# Patient Record
(demographics unavailable — no encounter records)

---

## 2024-10-29 NOTE — DISCUSSION/SUMMARY
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  She continues to have low back pain.  She reports pain with activity.  She has pain coming to the right hip.  She is fine during the day but at night she reports severe pain.  She has been taking muscle relaxers intermittently.  She does not like take medications due to the side effects.  She has been doing physical therapy.  Has no issues with gait or balance or bowel bladder incontinence.  No issues with fine motor activity.  Examination Patient is NAD, AAOX3 skin is intact, NTTP in LS SPINE mild pain with ROM 5/5 motor strength in BLE SILT L1-S1 BLE Negative straight leg raise Negative christian equal patella/achilles reflex normal gait  Imaging  MRI of the lumbar spine demonstrates L4-5 spondylolisthesis with right-sided facet arthropathy and a facet cyst outside the space.  There is mild lateral recess stenosis at L4-L5.  There is some hemangiomas without cortical expansion  Treatment note I discussed at length with the patient nature of the condition.  Her MRI demonstrates degenerative changes at L4-L5 to the right in the setting of spondylolisthesis.  She is a good candidate for injections and MBB RFA's.  Will refer her to Dr. Gillis.  She will continue with physical therapy.  Will provide with tizanidine, we discussed the side effects of this including sedation.  I will see her back on as-needed basis.  All the patient's questions were sought and answered.

## 2024-12-02 NOTE — DATA REVIEWED
[FreeTextEntry1] : Exam: MRI LUMBAR SPINE Order#: [id number]    CLINICAL INFORMATION: Low back pain.  ADDITIONAL CLINICAL INFORMATION: Spondylolisthesis M43.16  TECHNIQUE: Multiplanar, multisequence MRI was performed of the lumbar spine. IV Contrast: NONE  PRIOR STUDIES: No priors available for comparison.  FINDINGS:  LOCALIZER: No additional findings. BONES: There is no fracture or bone marrow edema. There is a hemangioma seen within the L1 vertebral body and a hemangioma seen in the L4 vertebral body. ALIGNMENT: There is minimal dextroscoliosis of the lumbar spine. There is minimal subluxation of L4 on L5 to the right (approximately 3 mm). There is grade 1 anterolisthesis of L4 on L5 (approximately 3 mm). No pars defects are seen. DISC SPACES: There is mild loss of height and T2 signal of the L2/L3 and L4/L5 disc spaces consistent with desiccation. There is loss of T2 signal within the remaining disc spaces consistent with desiccation. SACROILIAC JOINTS/SACRUM: There is no sacral fracture. The SI joints are partially visualized but are intact. CONUS AND CAUDA EQUINA: The distal cord and conus are normal in signal. Conus terminates at T12/L1.  VISUALIZED INTRAPELVIC/INTRA-ABDOMINAL SOFT TISSUES: Normal. PARASPINAL SOFT TISSUES: Normal.   INDIVIDUAL LEVELS:  LOWER THORACIC SPINE: No spinal canal or neuroforaminal stenosis.  At the L5/S1 level there is a very minimal disc bulge with a right posterior para central annular tear contacting the ventral thecal sac. The bilateral neuroforamen are patent. The bilateral L5 foraminal exiting nerve roots are within normal limits. There is no evidence of spinal canal stenosis.  At the L4/L5 level there is unroofing of the posterior disc space due to the mild anterolisthesis. There is a superimposed diffuse disc bulge and osteophyte flattening the ventral thecal sac. There is mild right and mild to moderate left foraminal narrowing. There is severe facet and ligamentous hypertrophy. There is a tiny amount of fluid seen in the facet joints. The bilateral L4 foraminal exiting nerve roots are within normal limits There is a small 0.5 cm synovial cyst along the posterior right facet and that it in the posterior paraspinal muscles. There is no evidence of spinal canal stenosis.  At the L3/L4, L2/L3 and L1/L2 levels there are right subarticular and foraminal minimal disc protrusions mildly flattening the right ventral thecal sac and contributing to minimal right foraminal narrowing. The bilateral foraminal exiting nerve roots appear within normal limits There is minimal left foraminal narrowing. There is no evidence of spinal canal stenosis.    IMPRESSION:  Minimal dextroscoliosis of the lumbar spine.  Minimal minimal subluxation of L4 on L5 to the right.  Grade 1 anterolisthesis of L4 on L5. No pars defects are seen.  L5/S1 very minimal disc bulge with a right posterior para central annular tear. L5/S1 no evidence of spinal canal stenosis.  L4/L5 diffuse disc bulge and osteophyte. L4/L5 no evidence of spinal canal stenosis.  L3/L4, L2/L3 and L1/L2 minimal right subarticular and foraminal minimal disc protrusions. No evidence of spinal canal stenosis.

## 2024-12-02 NOTE — HISTORY OF PRESENT ILLNESS
[Back Pain] : back pain [___ mths] : [unfilled] month(s) ago [Constant] : constant [6] : a minimum pain level of 6/10 [10] : a maximum pain level of 10/10 [Sharp] : sharp [Burning] : burning [Sitting] : sitting [Standing] : standing [Heat] : heat [Rest] : rest [Other: ___] : [unfilled] [FreeTextEntry1] : HPI: Ms. JACOB PRITCHETT is a 67 year F with no pmhx of osteopenia, recent left foot tendonitis (boot x 2 months), chronic lower back pain. Reports worsening of her lower back pain over the past 4 months. Pain severe with prolonged standing and sitting. Has been in physical therapy twice weekly since October but does not feel like it has been effective. Prn ibuprofen and lidocaine patches when pain is severe. Pain can be severe and is impacting her quality of life. Denies any additional weakness, numbness, bowel/bladder dysfunction, history of bleeding disorders.  [FreeTextEntry7] : lower back pain

## 2024-12-02 NOTE — ASSESSMENT
[FreeTextEntry1] : 67 yof presents w/ back pain.  I have personally reviewed the patient's MRI in detail and discussed my personal interpretation of the study which is significant for facet arthropathy in the lumbar spine.  The patient has moderate to severe chronic axial back pain which has been present for at least three months and has failed to improve with conservative therapy. There is no untreated radiculopathy. There is no other known cause of axial back pain in this patient. The patient has failed to have relief with medication management and physical therapy. Given the patients failure to improve with all other conservative measures, recommend left L3, L4, and L5 medial branch diagnostic block under fluoroscopic guidance. If the patient has significant relief of pain and improved quality of life following diagnostic block, will proceed to radiofrequency ablation.  I have discussed in detail with the patient that an interventional spine procedure is associated with potential risks. The procedure may include an injection of steroid and potentially other medications (local anesthetic and normal saline) into the epidural space or surrounding tissue of the spine. There are significant risks of this procedure which include and are not limited to infection, bleeding, worsening pain, dural puncture leading to post-dural puncture headache, nerve damage, spinal cord injury, paralysis, stroke, and death. There is a chance that the procedure does not improve their pain. I have discussed the risks of the procedure at length with the patient, and the potential benefits of pain relief. I have offered alternatives to the procedure. All questions were answered. The patient expressed understanding and wishes to proceed with the procedure.  Physical therapy prescribed - goal will be to increase ROM, strengthening, postural training, other modalities ad ginny which may include massage and stim. Goals of therapy discussed with the patient in detail and will be discussed with physical therapist. Patient will follow-up following course of physical therapy to monitor progress and adjust therapy as needed.  Acetaminophen 1,000 mg q8h prn for moderate pain. Risks, benefits, and alternatives of acetaminophen discussed with patient.  Ibuprofen 600 mg q8h prn add when pain is not adequately controlled with acetaminophen. Risks, benefits, and alternatives of ibuprofen discussed with patient.  Diet and nutritional strategies discussed which may improve patients pain and will improve overall health.

## 2024-12-02 NOTE — REASON FOR VISIT
[Initial Consultation] : an initial pain management consultation [FreeTextEntry2] : Ref by Dr. Narvaez

## 2024-12-02 NOTE — PHYSICAL EXAM
[de-identified] : Constitutional: Well-developed, in no acute distress  Musculoskeletal: Lumbar Spine:   Gait: Antalgic 		Inspection: Normal curvature, no abnormal kyphosis or scoliosis 		Facet loading: pain bilaterally 		Palpation: 			Lumbar and paraspinal muscles: pain bilaterally 			Sacroiliac joint: no pain 			Greater trochanter: no pain 		Muscle Strength: 		Iliopsoas: 5/5 bilaterally 		Quadriceps: 5/5 bilaterally 		Hamstrings: 5/5 bilaterally 		Tibialis anterior: 5/5 bilaterally 		Extensor hallucis longus: 5/5 bilaterally  		Sensation: normal and equal in bilateral lower extremities  Extremity: no edema noted Neurological: Memory normal, AAO x 3, Cranial nerves II - XII grossly normal Psychiatric: Appropriate mood and affect, oriented to time, place, person, and situation

## 2025-01-15 NOTE — ASSESSMENT
[FreeTextEntry1] : 67 yof presents w/ back pain with excellent relief after first medial branch block.  I have personally reviewed the patient's MRI in detail and discussed my personal interpretation of the study which is significant for facet arthropathy in the lumbar spine.  The patient has moderate to severe chronic axial back pain which has been present for at least three months and has failed to improve with conservative therapy. There is no untreated radiculopathy. There is no other known cause of axial back pain in this patient. The patient has failed to have relief with medication management and physical therapy. Given the patients failure to improve with all other conservative measures, recommend left L3, L4, and L5 medial branch diagnostic block under fluoroscopic guidance. If the patient has significant relief of pain and improved quality of life following diagnostic block, will proceed to radiofrequency ablation.  I have discussed in detail with the patient that an interventional spine procedure is associated with potential risks. The procedure may include an injection of steroid and potentially other medications (local anesthetic and normal saline) into the epidural space or surrounding tissue of the spine. There are significant risks of this procedure which include and are not limited to infection, bleeding, worsening pain, dural puncture leading to post-dural puncture headache, nerve damage, spinal cord injury, paralysis, stroke, and death. There is a chance that the procedure does not improve their pain. I have discussed the risks of the procedure at length with the patient, and the potential benefits of pain relief. I have offered alternatives to the procedure. All questions were answered. The patient expressed understanding and wishes to proceed with the procedure.  Physical therapy prescribed - goal will be to increase ROM, strengthening, postural training, other modalities ad ginny which may include massage and stim. Goals of therapy discussed with the patient in detail and will be discussed with physical therapist. Patient will follow-up following course of physical therapy to monitor progress and adjust therapy as needed.  Acetaminophen 1,000 mg q8h prn for moderate pain. Risks, benefits, and alternatives of acetaminophen discussed with patient.  Ibuprofen 600 mg q8h prn add when pain is not adequately controlled with acetaminophen. Risks, benefits, and alternatives of ibuprofen discussed with patient.  Diet and nutritional strategies discussed which may improve patients pain and will improve overall health.  Refer to ENT for respiratory evaluation.  I explained to patient benefits and limitation of TeleMedicine visits. Patient understands that limitations include inability to perform comprehensive physical exam, which may lead to potential diagnostic inconsistencies.  Patient understands that diagnosis and treatment may be limited by these inconsistencies and patient agrees to proceed with care plan.  Patient is scheduled for procedure based on history, imaging and limited physical exam performed on TeleHealth visit.  If necessary, additional focal physical exam will be performed on date of procedure

## 2025-01-15 NOTE — HISTORY OF PRESENT ILLNESS
[Back Pain] : back pain [___ mths] : [unfilled] month(s) ago [Constant] : constant [6] : a minimum pain level of 6/10 [10] : a maximum pain level of 10/10 [Sharp] : sharp [Burning] : burning [Sitting] : sitting [Standing] : standing [Heat] : heat [Rest] : rest [Other: ___] : [unfilled] [FreeTextEntry1] : Verbal consent was given by/on: Kathy Lloyd on 01/15/25  Patient location: Home, [place]  Physician location: Office, [place], [place]  Reason for Telehealth visit: Back pain  She reports that she had amazing relief from medial branch block as detailed above. Still has a respiratory illness. Quality of life is impaired. Wishes for further intervention.  This service took place using a two way audio and visual platform. The patient and Dr. Sanchez were both able to see each other and communicate through video. There were no barriers to communication. Greater then 50% of the time spent in the encounter involved counseling and coordination of care.   Time spent on visit: 30 minutes  HPI: Ms. KATHY LLOYD is a 67 year F with no pmhx of osteopenia, recent left foot tendonitis (boot x 2 months), chronic lower back pain. Reports worsening of her lower back pain over the past 4 months. Pain severe with prolonged standing and sitting. Has been in physical therapy twice weekly since October but does not feel like it has been effective. Prn ibuprofen and lidocaine patches when pain is severe. Pain can be severe and is impacting her quality of life. Denies any additional weakness, numbness, bowel/bladder dysfunction, history of bleeding disorders.  Interventions: L3, L4, and L5 medial branch diagnostic block (01/13/25): Pre-block pain: 10, Post-block pain: 2   [FreeTextEntry7] : lower back pain

## 2025-01-15 NOTE — DATA REVIEWED
[FreeTextEntry1] : Exam: MRI LUMBAR SPINE Order#: [id number]    CLINICAL INFORMATION: Low back pain.  ADDITIONAL CLINICAL INFORMATION: Spondylolisthesis M43.16  TECHNIQUE: Multiplanar, multisequence MRI was performed of the lumbar spine. IV Contrast: NONE  PRIOR STUDIES: No priors available for comparison.  FINDINGS:  LOCALIZER: No additional findings. BONES: There is no fracture or bone marrow edema. There is a hemangioma seen within the L1 vertebral body and a hemangioma seen in the L4 vertebral body. ALIGNMENT: There is minimal dextroscoliosis of the lumbar spine. There is minimal subluxation of L4 on L5 to the right (approximately 3 mm). There is grade 1 anterolisthesis of L4 on L5 (approximately 3 mm). No pars defects are seen. DISC SPACES: There is mild loss of height and T2 signal of the L2/L3 and L4/L5 disc spaces consistent with desiccation. There is loss of T2 signal within the remaining disc spaces consistent with desiccation. SACROILIAC JOINTS/SACRUM: There is no sacral fracture. The SI joints are partially visualized but are intact. CONUS AND CAUDA EQUINA: The distal cord and conus are normal in signal. Conus terminates at T12/L1.  VISUALIZED INTRAPELVIC/INTRA-ABDOMINAL SOFT TISSUES: Normal. PARASPINAL SOFT TISSUES: Normal.   INDIVIDUAL LEVELS:  LOWER THORACIC SPINE: No spinal canal or neuroforaminal stenosis.  At the L5/S1 level there is a very minimal disc bulge with a right posterior para central annular tear contacting the ventral thecal sac. The bilateral neuroforamen are patent. The bilateral L5 foraminal exiting nerve roots are within normal limits. There is no evidence of spinal canal stenosis.  At the L4/L5 level there is unroofing of the posterior disc space due to the mild anterolisthesis. There is a superimposed diffuse disc bulge and osteophyte flattening the ventral thecal sac. There is mild right and mild to moderate left foraminal narrowing. There is severe facet and ligamentous hypertrophy. There is a tiny amount of fluid seen in the facet joints. The bilateral L4 foraminal exiting nerve roots are within normal limits There is a small 0.5 cm synovial cyst along the posterior right facet and that it in the posterior paraspinal muscles. There is no evidence of spinal canal stenosis.  At the L3/L4, L2/L3 and L1/L2 levels there are right subarticular and foraminal minimal disc protrusions mildly flattening the right ventral thecal sac and contributing to minimal right foraminal narrowing. The bilateral foraminal exiting nerve roots appear within normal limits There is minimal left foraminal narrowing. There is no evidence of spinal canal stenosis.    IMPRESSION:  Minimal dextroscoliosis of the lumbar spine.  Minimal minimal subluxation of L4 on L5 to the right.  Grade 1 anterolisthesis of L4 on L5. No pars defects are seen.  L5/S1 very minimal disc bulge with a right posterior para central annular tear. L5/S1 no evidence of spinal canal stenosis.  L4/L5 diffuse disc bulge and osteophyte. L4/L5 no evidence of spinal canal stenosis.  L3/L4, L2/L3 and L1/L2 minimal right subarticular and foraminal minimal disc protrusions. No evidence of spinal canal stenosis.

## 2025-01-15 NOTE — PHYSICAL EXAM
[de-identified] : Constitutional: Well-developed, in no acute distress  Musculoskeletal: Lumbar Spine:   Gait: Antalgic 		Inspection: Normal curvature, no abnormal kyphosis or scoliosis 		Facet loading: pain bilaterally 		Palpation: 			Lumbar and paraspinal muscles: pain bilaterally 			Sacroiliac joint: no pain 			Greater trochanter: no pain 		Muscle Strength: 		Iliopsoas: 5/5 bilaterally 		Quadriceps: 5/5 bilaterally 		Hamstrings: 5/5 bilaterally 		Tibialis anterior: 5/5 bilaterally 		Extensor hallucis longus: 5/5 bilaterally  		Sensation: normal and equal in bilateral lower extremities  Extremity: no edema noted Neurological: Memory normal, AAO x 3, Cranial nerves II - XII grossly normal Psychiatric: Appropriate mood and affect, oriented to time, place, person, and situation

## 2025-02-10 NOTE — PROCEDURE
[FreeTextEntry1] : Left L3, L4, and L5 Medial Branch Diagnostic Block under Fluoroscopic guidance  The patient was placed in the prone position on the fluoroscopic table with a pillow under the abdomen. Routine monitors were applied. The back was draped in the usual sterile fashion and sterile technique was adhered to throughout the entire procedure.  The L4 vertebral body was identified under fluoroscopic guidance. The vertebral body end plates were aligned and the junction of the superior articular process and the base of the transverse process was brought into view using an oblique angulation of the C-arm. The skin and subcutaneous tissues were infiltrated with 1% Lidocaine.  A 22-gauge 3.5" spinal needle was inserted at the angle between the superior articular process and the base of the transverse process. Oblique angulation was used to guide the needle into position to approximate the L3 medial branch. The same sequence of events was performed at the L5 vertebral body for the L4 medial branch. Following this the L5 medial branch was identified at the lumbosacral junction and a 22-gauge 3.5" was guided fluoroscopically using AP view to the left lumbosacral junction. Approximately 1.0 cc of 0.25% Bupivacaine was injected at each level.  The patient tolerated the procedure well. There was no neurological deficit post procedure. The patient went to recovery room in stable condition. Discharge instructions were given to the patient.

## 2025-02-13 NOTE — HISTORY OF PRESENT ILLNESS
[Back Pain] : back pain [___ mths] : [unfilled] month(s) ago [Constant] : constant [6] : a minimum pain level of 6/10 [10] : a maximum pain level of 10/10 [Sharp] : sharp [Burning] : burning [Sitting] : sitting [Standing] : standing [Heat] : heat [Rest] : rest [Other: ___] : [unfilled] [FreeTextEntry1] : Verbal consent was given by/on: Kathy Lloyd on 02/12/25  Patient location: Home, [place]  Physician location: Office, [place], [place]  Reason for Telehealth visit: Back pain  She reports that she had amazing relief from medial branch block as detailed above. This is her second succssful block. Quality of life is impaired. Wishes for further intervention.  This service took place using a two way audio and visual platform. The patient and Dr. Sanchez were both able to see each other and communicate through video. There were no barriers to communication. Greater then 50% of the time spent in the encounter involved counseling and coordination of care.   Time spent on visit: 30 minutes  HPI: Ms. KATHY LLOYD is a 67 year F with no pmhx of osteopenia, recent left foot tendonitis (boot x 2 months), chronic lower back pain. Reports worsening of her lower back pain over the past 4 months. Pain severe with prolonged standing and sitting. Has been in physical therapy twice weekly since October but does not feel like it has been effective. Prn ibuprofen and lidocaine patches when pain is severe. Pain can be severe and is impacting her quality of life. Denies any additional weakness, numbness, bowel/bladder dysfunction, history of bleeding disorders.  Interventions: L3, L4, and L5 medial branch diagnostic block (02/10/25): Pre-block pain: 10, Post-block pain: 2 L3, L4, and L5 medial branch diagnostic block (01/13/25): Pre-block pain: 10, Post-block pain: 2   [FreeTextEntry7] : lower back pain

## 2025-02-13 NOTE — ASSESSMENT
[FreeTextEntry1] : 67 yof presents w/ back pain with excellent relief from two medial branch diagnostic blocks.  I have personally reviewed the patient's MRI in detail and discussed my personal interpretation of the study which is significant for facet arthropathy in the lumbar spine.  The patient has failed to have relief with medication management and six weeks of physical therapy within the last three months. The patient reports over 80% pain relief and improved quality of life following left L3, L4, and L5 medial branch diagnostic block on two separate occasions. Given the patients failure to improve with all other conservative measures, and excellent relief from diagnostic block, recommend left L3, L4, and L5 medial branch radiofrequency ablation under fluoroscopic guidance. Patient will follow-up with me in my office two weeks following ablation.  I have discussed in detail with the patient that an interventional spine procedure is associated with potential risks. The procedure may include an injection of steroid and potentially other medications (local anesthetic and normal saline) into the epidural space or surrounding tissue of the spine. There are significant risks of this procedure which include and are not limited to infection, bleeding, worsening pain, dural puncture leading to post-dural puncture headache, nerve damage, spinal cord injury, paralysis, stroke, and death. There is a chance that the procedure does not improve their pain. I have discussed the risks of the procedure at length with the patient, and the potential benefits of pain relief. I have offered alternatives to the procedure. All questions were answered. The patient expressed understanding and wishes to proceed with the procedure.  Physical therapy prescribed - goal will be to increase ROM, strengthening, postural training, other modalities ad ginny which may include massage and stim. Goals of therapy discussed with the patient in detail and will be discussed with physical therapist. Patient will follow-up following course of physical therapy to monitor progress and adjust therapy as needed.  Acetaminophen 1,000 mg q8h prn for moderate pain. Risks, benefits, and alternatives of acetaminophen discussed with patient.  Ibuprofen 600 mg q8h prn add when pain is not adequately controlled with acetaminophen. Risks, benefits, and alternatives of ibuprofen discussed with patient.  Diet and nutritional strategies discussed which may improve patients pain and will improve overall health.  I explained to patient benefits and limitation of TeleMedicine visits. Patient understands that limitations include inability to perform comprehensive physical exam, which may lead to potential diagnostic inconsistencies.  Patient understands that diagnosis and treatment may be limited by these inconsistencies and patient agrees to proceed with care plan.  Patient is scheduled for procedure based on history, imaging and limited physical exam performed on TeleHealth visit.  If necessary, additional focal physical exam will be performed on date of procedure

## 2025-02-13 NOTE — PHYSICAL EXAM
[de-identified] : Constitutional: Well-developed, in no acute distress  Musculoskeletal: Lumbar Spine:   Gait: Antalgic 		Inspection: Normal curvature, no abnormal kyphosis or scoliosis 		Facet loading: pain bilaterally 		Palpation: 			Lumbar and paraspinal muscles: pain bilaterally 			Sacroiliac joint: no pain 			Greater trochanter: no pain 		Muscle Strength: 		Iliopsoas: 5/5 bilaterally 		Quadriceps: 5/5 bilaterally 		Hamstrings: 5/5 bilaterally 		Tibialis anterior: 5/5 bilaterally 		Extensor hallucis longus: 5/5 bilaterally  		Sensation: normal and equal in bilateral lower extremities  Extremity: no edema noted Neurological: Memory normal, AAO x 3, Cranial nerves II - XII grossly normal Psychiatric: Appropriate mood and affect, oriented to time, place, person, and situation

## 2025-02-13 NOTE — DATA REVIEWED
[FreeTextEntry1] : Exam: MRI LUMBAR SPINE  FINDINGS:  LOCALIZER: No additional findings. BONES: There is no fracture or bone marrow edema. There is a hemangioma seen within the L1 vertebral body and a hemangioma seen in the L4 vertebral body. ALIGNMENT: There is minimal dextroscoliosis of the lumbar spine. There is minimal subluxation of L4 on L5 to the right (approximately 3 mm). There is grade 1 anterolisthesis of L4 on L5 (approximately 3 mm). No pars defects are seen. DISC SPACES: There is mild loss of height and T2 signal of the L2/L3 and L4/L5 disc spaces consistent with desiccation. There is loss of T2 signal within the remaining disc spaces consistent with desiccation. SACROILIAC JOINTS/SACRUM: There is no sacral fracture. The SI joints are partially visualized but are intact. CONUS AND CAUDA EQUINA: The distal cord and conus are normal in signal. Conus terminates at T12/L1.  VISUALIZED INTRAPELVIC/INTRA-ABDOMINAL SOFT TISSUES: Normal. PARASPINAL SOFT TISSUES: Normal.   INDIVIDUAL LEVELS:  LOWER THORACIC SPINE: No spinal canal or neuroforaminal stenosis.  At the L5/S1 level there is a very minimal disc bulge with a right posterior para central annular tear contacting the ventral thecal sac. The bilateral neuroforamen are patent. The bilateral L5 foraminal exiting nerve roots are within normal limits. There is no evidence of spinal canal stenosis.  At the L4/L5 level there is unroofing of the posterior disc space due to the mild anterolisthesis. There is a superimposed diffuse disc bulge and osteophyte flattening the ventral thecal sac. There is mild right and mild to moderate left foraminal narrowing. There is severe facet and ligamentous hypertrophy. There is a tiny amount of fluid seen in the facet joints. The bilateral L4 foraminal exiting nerve roots are within normal limits There is a small 0.5 cm synovial cyst along the posterior right facet and that it in the posterior paraspinal muscles. There is no evidence of spinal canal stenosis.  At the L3/L4, L2/L3 and L1/L2 levels there are right subarticular and foraminal minimal disc protrusions mildly flattening the right ventral thecal sac and contributing to minimal right foraminal narrowing. The bilateral foraminal exiting nerve roots appear within normal limits There is minimal left foraminal narrowing. There is no evidence of spinal canal stenosis.    IMPRESSION:  Minimal dextroscoliosis of the lumbar spine.  Minimal minimal subluxation of L4 on L5 to the right.  Grade 1 anterolisthesis of L4 on L5. No pars defects are seen.  L5/S1 very minimal disc bulge with a right posterior para central annular tear. L5/S1 no evidence of spinal canal stenosis.  L4/L5 diffuse disc bulge and osteophyte. L4/L5 no evidence of spinal canal stenosis.  L3/L4, L2/L3 and L1/L2 minimal right subarticular and foraminal minimal disc protrusions. No evidence of spinal canal stenosis.

## 2025-02-13 NOTE — HISTORY OF PRESENT ILLNESS
[Back Pain] : back pain [___ mths] : [unfilled] month(s) ago [Constant] : constant [6] : a minimum pain level of 6/10 [10] : a maximum pain level of 10/10 [Sharp] : sharp [Burning] : burning [Sitting] : sitting [Standing] : standing [Heat] : heat [Rest] : rest [Other: ___] : [unfilled] [FreeTextEntry1] : Verbal consent was given by/on: Kathy Lloyd on 02/12/25  Patient location: Home, [place]  Physician location: Office, [place], [place]  Reason for Telehealth visit: Back pain  She reports that she had amazing relief from medial branch block as detailed above. This is her second succssful block. Quality of life is impaired. Wishes for further intervention.  This service took place using a two way audio and visual platform. The patient and Dr. Sanchez were both able to see each other and communicate through video. There were no barriers to communication. Greater then 50% of the time spent in the encounter involved counseling and coordination of care.   Time spent on visit: 30 minutes  HPI: Ms. KATHY LLOYD is a 67 year F with no pmhx of osteopenia, recent left foot tendonitis (boot x 2 months), chronic lower back pain. Reports worsening of her lower back pain over the past 4 months. Pain severe with prolonged standing and sitting. Has been in physical therapy twice weekly since October but does not feel like it has been effective. Prn ibuprofen and lidocaine patches when pain is severe. Pain can be severe and is impacting her quality of life. Denies any additional weakness, numbness, bowel/bladder dysfunction, history of bleeding disorders.  Interventions: L3, L4, and L5 medial branch diagnostic block (02/10/25): Pre-block pain: 10, Post-block pain: 2 L3, L4, and L5 medial branch diagnostic block (01/13/25): Pre-block pain: 10, Post-block pain: 2   [FreeTextEntry7] : lower back pain  Primary Defect Length In Cm (Final Defect Size - Required For Flaps/Grafts): 1.4

## 2025-02-13 NOTE — PHYSICAL EXAM
[de-identified] : Constitutional: Well-developed, in no acute distress  Musculoskeletal: Lumbar Spine:   Gait: Antalgic 		Inspection: Normal curvature, no abnormal kyphosis or scoliosis 		Facet loading: pain bilaterally 		Palpation: 			Lumbar and paraspinal muscles: pain bilaterally 			Sacroiliac joint: no pain 			Greater trochanter: no pain 		Muscle Strength: 		Iliopsoas: 5/5 bilaterally 		Quadriceps: 5/5 bilaterally 		Hamstrings: 5/5 bilaterally 		Tibialis anterior: 5/5 bilaterally 		Extensor hallucis longus: 5/5 bilaterally  		Sensation: normal and equal in bilateral lower extremities  Extremity: no edema noted Neurological: Memory normal, AAO x 3, Cranial nerves II - XII grossly normal Psychiatric: Appropriate mood and affect, oriented to time, place, person, and situation

## 2025-07-26 NOTE — HISTORY OF PRESENT ILLNESS
[Right] : right hand dominant [FreeTextEntry1] : right index finger DIPJ pain and deformity. 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 Has been worsening over many years. No treatment.

## 2025-07-26 NOTE — HISTORY OF PRESENT ILLNESS
[Right] : right hand dominant [FreeTextEntry1] : right index finger DIPJ pain and deformity. 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 Has been worsening over many years. No treatment.

## 2025-07-26 NOTE — PHYSICAL EXAM
[de-identified] : General: No acute distress Respiratory: Nonlabored Cardiovascular: Warm and well-perfused   Right index finger Skin: Intact, no swelling, no ecchymosis Motor: AIN PIN M R U motor intact Sensation: No numbness or tingling Can make a full fist and extend all fingers Index finger deformed at DIPJ from OA Warm and well-perfused brisk cap refill  [de-identified] :  3 views of the right hand were obtained and reviewed in clinic showing Advanced OA deofrmity at DIPJ of index and middle, as well as PIPJ of middle

## 2025-07-26 NOTE — ASSESSMENT
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 Patient understands and would like to think about it. Will call if she would like to proceed.

## 2025-07-26 NOTE — PHYSICAL EXAM
[de-identified] : General: No acute distress Respiratory: Nonlabored Cardiovascular: Warm and well-perfused   Right index finger Skin: Intact, no swelling, no ecchymosis Motor: AIN PIN M R U motor intact Sensation: No numbness or tingling Can make a full fist and extend all fingers Index finger deformed at DIPJ from OA Warm and well-perfused brisk cap refill  [de-identified] :  3 views of the right hand were obtained and reviewed in clinic showing Advanced OA deofrmity at DIPJ of index and middle, as well as PIPJ of middle